# Patient Record
Sex: FEMALE | Race: WHITE | NOT HISPANIC OR LATINO | Employment: FULL TIME | RURAL
[De-identification: names, ages, dates, MRNs, and addresses within clinical notes are randomized per-mention and may not be internally consistent; named-entity substitution may affect disease eponyms.]

---

## 2022-05-17 ENCOUNTER — OFFICE VISIT (OUTPATIENT)
Dept: FAMILY MEDICINE CLINIC | Facility: CLINIC | Age: 27
End: 2022-05-17

## 2022-05-17 VITALS
SYSTOLIC BLOOD PRESSURE: 108 MMHG | DIASTOLIC BLOOD PRESSURE: 66 MMHG | WEIGHT: 169 LBS | HEIGHT: 59 IN | BODY MASS INDEX: 34.07 KG/M2

## 2022-05-17 DIAGNOSIS — R07.81 RIB PAIN: ICD-10-CM

## 2022-05-17 DIAGNOSIS — M79.602 LEFT ARM PAIN: ICD-10-CM
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
## 2022-05-17 DIAGNOSIS — M25.562 ACUTE PAIN OF LEFT KNEE: ICD-10-CM

## 2022-05-17 PROCEDURE — 99213 OFFICE O/P EST LOW 20 MIN: CPT | Performed by: STUDENT IN AN ORGANIZED HEALTH CARE EDUCATION/TRAINING PROGRAM

## 2022-05-17 PROCEDURE — 90471 IMMUNIZATION ADMIN: CPT | Performed by: STUDENT IN AN ORGANIZED HEALTH CARE EDUCATION/TRAINING PROGRAM

## 2022-05-17 PROCEDURE — 90714 TD VACC NO PRESV 7 YRS+ IM: CPT | Performed by: STUDENT IN AN ORGANIZED HEALTH CARE EDUCATION/TRAINING PROGRAM

## 2022-05-17 RX ORDER — METHOCARBAMOL 500 MG/1
500 TABLET, FILM COATED ORAL 3 TIMES DAILY PRN
Qty: 30 TABLET | Refills: 0 | Status: SHIPPED | OUTPATIENT
Start: 2022-05-17

## 2022-05-17 NOTE — PROGRESS NOTES
"Chief Complaint  Back Pain (Fell off a horse on Sunday)    Subjective          Margoth Booth presents to [facility]  History of Present Illness    She states that she was riding horses [place] the creek and she did not make it up the creek bed and has been having some pain and the mid back and the left knee. She states that she thinks that the horse landed on her left side, but not fully.  He states that she is able to breathe okay, but states that she has pain with deep inspiration, and a whenever she is.      Objective   Vital Signs:   /66 (BP Location: Left arm, Patient Position: Sitting, Cuff Size: Adult)   Ht 149.9 cm (59\")   Wt 76.7 kg (169 lb)   BMI 34.13 kg/m²     Body mass index is 34.13 kg/m².    Review of Systems    Past History:  Medical History: has a past medical history of Premature baby and Reading disorder.   Surgical History: has no past surgical history on file.   Family History: family history is not on file.   Social History: reports that she has never smoked. She has never used smokeless tobacco. She reports previous alcohol use. She reports that she does not use drugs.      Current Outpatient Medications:   •  methocarbamol (Robaxin) 500 MG tablet, Take 1 tablet by mouth 3 (Three) Times a Day As Needed for Muscle Spasms., Disp: 30 tablet, Rfl: 0    Allergies: Patient has no known allergies.    Physical Exam  Constitutional:       General: She is not in acute distress.     Appearance: Normal appearance. She is not ill-appearing or toxic-appearing.   HENT:      Head: Normocephalic and atraumatic.   Cardiovascular:      Rate and Rhythm: Normal rate and regular rhythm.      Heart sounds: No murmur heard.    No gallop.   Pulmonary:      Effort: Pulmonary effort is normal.      Breath sounds: Normal breath sounds.   Musculoskeletal:      Right lower leg: No edema.      Left lower leg: No edema.      Comments: Pain with palpation of the ribs bilaterally, worse on " the left over the floating ribs.  Tenderness overlying bruises on knees, but no bony tenderness on the left knee   Skin:     Comments: Scattered excoriations and abrasions on the back.  Bruising noted on the upper arm on the left and the left knee.  Mild swelling of the left knee   Neurological:      General: No focal deficit present.      Mental Status: She is alert and oriented to person, place, and time.   Psychiatric:         Mood and Affect: Mood normal.         Thought Content: Thought content normal.          Result Review :{Labs  Result Review  Imaging  Med Tab  Media  Procedures :23}                   Assessment and Plan    Diagnoses and all orders for this visit:    1. Animal-rider injured by fall from or being thrown from horse in noncollision accident, initial encounter (Primary)  Assessment & Plan:  We will do x-rays of the ribs bilaterally, the left knee, and the left upper arm.  X-ray unavailable at this office so we will send to [place].  Discussed anti-inflammatories.  Td given in the office today.  We will do muscle relaxants for the next few days to help with muscle tenderness.  Back with results of x-rays after radiology reads    Orders:  -     XR Knee 1 or 2 View Left; Future  -     XR Ribs Bilateral 3 View (In Office)  -     XR Humerus Left (In Office)    2. Acute pain of left knee  -     XR Knee 1 or 2 View Left; Future  -     XR Ribs Bilateral 3 View (In Office)  -     XR Humerus Left (In Office)    3. Rib pain  -     XR Knee 1 or 2 View Left; Future  -     XR Ribs Bilateral 3 View (In Office)  -     XR Humerus Left (In Office)    4. Left arm pain  -     XR Knee 1 or 2 View Left; Future  -     XR Ribs Bilateral 3 View (In Office)  -     XR Humerus Left (In Office)    Other orders  -     Td Vaccine Greater Than or Equal To 6yo Preservative Free IM  -     methocarbamol (Robaxin) 500 MG tablet; Take 1 tablet by mouth 3 (Three) Times a Day As Needed for Muscle Spasms.  Dispense: 30  tablet; Refill: 0      Follow Up   No follow-ups on file.  Patient was given instructions and counseling regarding her condition or for health maintenance advice. Please see specific information pulled into the AVS if appropriate.     Rashida Tim, DO

## 2022-05-17 NOTE — ASSESSMENT & PLAN NOTE
We will do x-rays of the ribs bilaterally, the left knee, and the left upper arm.  X-ray unavailable at this office so we will send to [place].  Discussed anti-inflammatories.  Td given in the office today.  We will do muscle relaxants for the next few days to help with muscle tenderness.  Back with results of x-rays after radiology reads